# Patient Record
(demographics unavailable — no encounter records)

---

## 2024-12-07 NOTE — HISTORY OF PRESENT ILLNESS
[FreeTextEntry2] :  Violet is a 13y0m old transfemale adolescent on supprelin since 11/30/23 presenting for followup. She was last seen on 7/2/24. Labs obtained at the visit significant for LH 0.5 iu/l, testosterone 17.1 ng/dl (pre-pubertal), vitamin D 22.5 ng/ml.  Since her last visit, Violet moved with her family to [place] for mom to attend [employer] school. She has been well with no recent illness or hospitalization. No pubertal changes noted, moods have been stable. Violet had the following labs done on 11/23/24: LH <0.22 miu/ml, testosterone 0.16 ng/ml, vitamin D 38 ng/ml.   Violet is no longer receiving mental health services through [facility]. There is a counselor at school she can see as needed. She also has an appointment with a psychiatrist in January. She was previously taking stimulant medication for ADHD but ran out. She did not rush to refill as she felt anxious on it. She has been off of medication for ADHD since running out. She may or may not re-start when she sees psychiatry in January.   Violet is in the 8th grade, attends school in [place]. Classmates know her as Violet she/her, they do not know she is trans.

## 2024-12-07 NOTE — REASON FOR VISIT
[Home] : at home, [unfilled] , at the time of the visit. [Medical Office: ([facility])___] : at the medical office located in  [Follow-Up: _____] : a [unfilled] follow-up visit  [Patient] : patient [Father] : father [Medical Records] : medical records [FreeTextEntry2] : father [FreeTextEntry3] : father

## 2024-12-07 NOTE — CONSULT LETTER
[Dear  ___] : Dear  [unfilled], [Consult Letter:] : I had the pleasure of evaluating your patient, [unfilled]. [Please see my note below.] : Please see my note below. [Consult Closing:] : Thank you very much for allowing me to participate in the care of this patient.  If you have any questions, please do not hesitate to contact me. [Sincerely,] : Sincerely, [FreeTextEntry3] : Cee Leon MD [facility] Physician [facility] Division of Pediatric Endocrinology

## 2025-06-09 NOTE — PHYSICAL EXAM
[Well Nourished] : well nourished [Alert] : alert [Healthy Appearance] : healthy appearance [No Acute Distress] : no acute distress [Well Developed] : well developed [Normal Pupil & Iris Size/Symmetry] : normal pupil and iris size and symmetry [No Discharge] : no discharge [No Photophobia] : no photophobia [Sclera Not Icteric] : sclera not icteric [Normal Nasal Mucosa] : the nasal mucosa was normal [Normal Lips/Tongue] : the lips and tongue were normal [Normal TMs] : both tympanic membranes were normal [Normal Outer Ear/Nose] : the ears and nose were normal in appearance [Normal Tonsils] : normal tonsils [No Thrush] : no thrush [Pale mucosa] : pale mucosa [Supple] : the neck was supple [Normal Rate and Effort] : normal respiratory rhythm and effort [No Crackles] : no crackles [Bilateral Audible Breath Sounds] : bilateral audible breath sounds [No Retractions] : no retractions [Normal S1, S2] : normal S1 and S2 [Normal Rate] : heart rate was normal  [No murmur] : no murmur [Regular Rhythm] : with a regular rhythm [Soft] : abdomen soft [Not Tender] : non-tender [No HSM] : no hepato-splenomegaly [Not Distended] : not distended [Normal Cervical Lymph Nodes] : cervical [Skin Intact] : skin intact  [No Rash] : no rash [No Skin Lesions] : no skin lesions [No clubbing] : no clubbing [No Edema] : no edema [No Cyanosis] : no cyanosis [Normal Mood] : mood was normal [Normal Affect] : affect was normal [Alert, Awake, Oriented as Age-Appropriate] : alert, awake, oriented as age appropriate

## 2025-06-09 NOTE — HISTORY OF PRESENT ILLNESS
[(# ___since the last visit)] : [unfilled] visits to the emergency room since the last visit [(# ___ since the last visit)] : hospitalized [unfilled] times since the last visit [( # ___ since the last visit)] : intubated [unfilled] times since the last visit [0 x/month] : 0 x/month [None] : None [< or = 2 days/wk] : < than or = 2 days/week [> or = 20] : > than or = 20 [0 - 1/year] : 0 - 1/year [No] : No [FreeTextEntry1] : COCO MANDUJANO is a 13 year old, transfemale seen on 7/22/2024 for transgender care program followup. Patient is having a procedure on June 16th to remove the Supprelin implant in her left upper inner arm.   Preferred Pronouns:   she/her  Sexual orientation:  Gender identity: female/girl  Assigned male at birth Specific Terms for Body Parts:  private area Call pt: Coco  Moved to [place] 1 year ago for mom to start Vet school there.  She's in a bilingual school and doing well.   Marital Status: Father, Valeriano Mandujano, mother Aicha Lin. Parents are  and live together, both parents are supportive. Here today with Dad. Feels well supported at home.   Pt had Suprellin placed by Dr. Bravo on 11/2023.  Last saw Dr. Graf in Dec 2024.   Transition HX + Present Life:  Coco came out to her parents around age 9 or 10 years old, family has been supportive and wants her to be happy. Lives at home with mom, dad, and 2 siblings, cats, dogs, and assorted lizards, feels safe at home, food secure.  About age 9 , started thinking that she didn't' feel comfortable as a boy.  She did not know anyone else like this.  For fun, likes to play Roblox, and chatting online with friends (that she knows).    She met a friend in 1st grade, but she lost a friend in 4th grade as she came out.   At school started using Coco in the school year in in May because friends said the name 'suited her'.  She picked Coco, and mom likes Elis, so she goes by Coco Gillis.  Friends at school at very supportive of Coco, but some other kids in her class use racial and sexual slurs that bother her. Feels safe at school.   Name change: Insurance is now updated to reflect Coco.   Education | Employment:  Coco is going into 9th grade in person called [employer] in [place]. Patient doing well in school. Favorite subjects are math and english. Has a few close friends that she enjoys hanging out with. Does not play any sports, but likes to walk and swim.   Mental Health: Coco feels school is very stressful and she gets tired. She feels left out occasionally. Coco is sleeping better (10 pm to 8/9am). Anxiety is "pretty bad". Feels she is unable to speak sometimes because she is too anxious to talk. Feels like she has social anxiety. No longer seeing a therapist. Still does art. Has infrequent suicical ideations, but no plan and feels she would never do it. Can talk to both parents about these thoughts. No cutting or other self harm mechanisms.   [facility], dx with ADHD. Tx with Rx briefly, heart racing, went to Cardiology, and now tried interventions first before Rx.  Wait and see approach.  [facility] - reval in a few months if in [place].    Saw [facility].   Endocrinology,Dr. Graf.  Doing okay with Luz Elena, happy no puberty at this time.  Interested in starting estrogen to look like they want to be since they were little.   Interested in developing a chest with other girls her age. She' noticed that others are starting to do this and she does not want to use breast implants. .    Coping:  Likes to watch YouTube and listen to music.  Feelings of Gender Dysphoria/ Body Dysmorphia:  Feelings of gender dysphoria in the shower, but otherwise fine. She doesnt like the way she looks. Uses makeup.   Gender Presentation:  Presents feminine. Long hair, girls clothing.   Tattoos/ Piercing:  Ear piercing.   Cigarette, Vaping, Marijuana, Alcohol, and Drug Use:  N/A  Preferably interested in women.   Asthma symptoms well controlled, has not used albuterol in a few months.   Reproductive Endocrinology:  Pt expressed an interest in adopting a child, but not having a cihld with her sperm.  Discussed pausing blockers before starting hormone affimring therapy and pt and father agreed that this is not desired.  .  Gender Affirming Surgery:  Not sure yet.  Diet: likes to eat proteins and then carbs.    No boyfriend/girlfriend. No kissing Likes kpop music No EtOH/No Tob/No Vape/No substances  [FreeTextEntry7] : 22

## 2025-06-09 NOTE — HISTORY OF PRESENT ILLNESS
[(# ___since the last visit)] : [unfilled] visits to the emergency room since the last visit [(# ___ since the last visit)] : hospitalized [unfilled] times since the last visit [( # ___ since the last visit)] : intubated [unfilled] times since the last visit [0 x/month] : 0 x/month [None] : None [< or = 2 days/wk] : < than or = 2 days/week [0 - 1/year] : 0 - 1/year [> or = 20] : > than or = 20 [No] : No [FreeTextEntry1] : COCO MANDUJANO is a 13 year old, transfemale seen on 7/22/2024 for transgender care program followup. Patient is having a procedure on June 16th to remove the Supprelin implant in her left upper inner arm.   Preferred Pronouns:   she/her  Sexual orientation:  Gender identity: female/girl  Assigned male at birth Specific Terms for Body Parts:  private area Call pt: Coco  Moved to [place] 1 year ago for mom to start Vet school there.  She's in a bilingual school and doing well.   Marital Status: Father, Valeriano Mandujano, mother Aicha Lin. Parents are  and live together, both parents are supportive. Here today with Dad. Feels well supported at home.   Pt had Suprellin placed by Dr. Bravo on 11/2023.  Last saw Dr. Graf in Dec 2024.   Transition HX + Present Life:  Coco came out to her parents around age 9 or 10 years old, family has been supportive and wants her to be happy. Lives at home with mom, dad, and 2 siblings, cats, dogs, and assorted lizards, feels safe at home, food secure.  About age 9 , started thinking that she didn't' feel comfortable as a boy.  She did not know anyone else like this.  For fun, likes to play Roblox, and chatting online with friends (that she knows).    She met a friend in 1st grade, but she lost a friend in 4th grade as she came out.   At school started using Coco in the school year in in May because friends said the name 'suited her'.  She picked Coco, and mom likes Elis, so she goes by Coco Gillis.  Friends at school at very supportive of Coco, but some other kids in her class use racial and sexual slurs that bother her. Feels safe at school.   Name change: Insurance is now updated to reflect Coco.   Education | Employment:  Coco is going into 9th grade in person called "[employer] in [place]. Patient doing well in school. Favorite subjects are math and english. Has a few close friends that she enjoys hanging out with. Does not play any sports, but likes to walk and swim.   Mental Health: Coco feels school is very stressful and she gets tired. She feels left out occasionally. Coco is sleeping better (10 pm to 8/9am). Anxiety is "pretty bad". Feels she is unable to speak sometimes because she is too anxious to talk. Feels like she has social anxiety. No longer seeing a therapist. Still does art. Has infrequent suicical ideations, but no plan and feels she would never do it. Can talk to both parents about these thoughts. No cutting or other self harm mechanisms.   [facility], dx with ADHD. Tx with Rx briefly, heart racing, went to Cardiology, and now tried interventions first before Rx.  Wait and see approach.  [facility] - reval in a few months if in [place].    Saw [facility].   Endocrinology,Dr. Graf.  Doing okay with Luz Elena, happy no puberty at this time.  Interested in starting estrogen to look like they want to be since they were little.   Interested in developing a chest with other girls her age. She' noticed that others are starting to do this and she does not want to use breast implants. .    Coping:  Likes to watch YouTube and listen to music.  Feelings of Gender Dysphoria/ Body Dysmorphia:  Feelings of gender dysphoria in the shower, but otherwise fine. She doesnt like the way she looks. Uses makeup.   Gender Presentation:  Presents feminine. Long hair, girls clothing.   Tattoos/ Piercing:  Ear piercing.   Cigarette, Vaping, Marijuana, Alcohol, and Drug Use:  N/A  Preferably interested in women.   Asthma symptoms well controlled, has not used albuterol in a few months.   Reproductive Endocrinology:  Pt expressed an interest in adopting a child, but not having a cihld with her sperm.  Discussed pausing blockers before starting hormone affimring therapy and pt and father agreed that this is not desired.  .  Gender Affirming Surgery:  Not sure yet.  Diet: likes to eat proteins and then carbs.    No boyfriend/girlfriend. No kissing Likes kpop music No EtOH/No Tob/No Vape/No substances  [FreeTextEntry7] : 22

## 2025-06-11 NOTE — PHYSICAL EXAM
[Well groomed] : well groomed [Cooperative] : cooperative [Average] : average [Euthymic] : euthymic [Full] : full [Linear/Goal Directed] : linear/goal directed [Clear] : clear [None] : none [None Reported] : none reported [Above average] : above average [WNL] : within normal limits [Positive interaction] : positive interaction [Unremarkable/age appropriate] : unremarkable/age appropriate

## 2025-06-11 NOTE — PHYSICAL EXAM
[Well groomed] : well groomed [Average] : average [Cooperative] : cooperative [Euthymic] : euthymic [Full] : full [Linear/Goal Directed] : linear/goal directed [Clear] : clear [None] : none [None Reported] : none reported [Above average] : above average [Positive interaction] : positive interaction [WNL] : within normal limits [Unremarkable/age appropriate] : unremarkable/age appropriate

## 2025-06-12 NOTE — DISCUSSION/SUMMARY
[FreeTextEntry8] : BCW, Coco and her father, Valeriano, met session in person to discuss Coco's next steps for her gender affirming medical care. Coco presented as well-groomed, casually dressed and engaged in our visit and presents with a positive interaction with her father observed by this LCSW.  Coco's father is prepared to consent for both him and Coco's mother regarding Coco's next step in care with hormone replacement therapy since Coco has been on puberty blockers since Nov, 2023.  Coco and her dad traveled to [place] for her follow-up care since the family moved to [place] so that Coco's mother could attend veterinary school there; Coco lives there with her parents and two younger siblings.   During this visit, BCW administered the PHQ modified for teens in which Coco scored 8/27, and denying that she has experienced recent thoughts that she would be better off dead.  She reports some days feeling sad and feeling tired with low energy.  She reports that she can continue to engage in her hobbies and is more physically active in [place] because of the warmer weather.  Coco reports that her gender dysphoria is mostly managed to an extent, though sometimes she feels it is hard to connect with female peers because her experience with her gender is different from them.  She denies any major dysphoria with her genitals but reports dysphoria about her voice and hoping it does not get deeper, and notes that sometimes her body shape is uncomfortable to her.   With Coco's father present, we discuss what the family can expect regarding the changes that will occur, including what will be permanent and the time frame for expected changes.  BCW educates Coco and her father that she will be required to take two medications, a medication to block the testosterone in her body and estrogen to feminize her body.  We discuss the modalities that Coco can take these medications and the family agrees likely that pill form will be easiest to travel with and manage, but note that they will discuss this with the endocrinologist.  We review permanence of breast and nipple growth, that Coco will experience softening of her skin, redistribution of body fat and changes to muscle mass that will occur. We discuss how her genitals will be impacted by estrogen/blocker and the potential for future atrophy as Coco gets older and has been on HRT for some time, and how gender affirming surgeries can address pain/discomfort.  We review how in being on puberty blocker medication, Coco should not experience much more deepening of her voice,  and that her body hair will not be as course or thick but will grow in places like her under arms, legs and genital area.  We again review how fertility could be impacted long-term by starting HRT now and the family confirms that Coco's mental health will be prioritized over fertility preservation; we review how the LGBTQ+ community forms families in many different ways which Coco expresses understanding. We also discuss risks to overall long-term kidney functioning due to androgen blocking medication but again review how her ongoing follow-up and blood work with her providers will assist in managing these risks.  We discuss how Coco's emotions will be experienced, processed and expressed differently with estrogen, noting the potential for more anxiety, which she and her father can verbalize understanding.   Finally, Coco is educated on the importance of being a diligent patient.  We review the importance of her consistent medication compliance with her HRT so that she can achieve the necessary changes to affirm herself.  We review the importance of complying with her providers' recommendations and seeking care with specialized gender affirming providers in the future.  We review the importance of Coco managing her general health through healthy diet and exercise, avoiding use of drugs/alcohol/tobacco.  We review that taking estrogen will increase Coco's need for cancer monitoring, that she will be more at risk for blood clots.   Coco and her father are provided with written education from [facility], a guide on feminizing hormones so that they can review the information with Coco's mom, who Mr. Gus moser will likely be joining the visit with the endocrinologist via FaceTime call from [place].  The family is encouraged to consider any additional questions prior to their visit with Dr. Leon on Fri, 6/13/25.    Time in: 12:01pm     Time out:  12:57pm Total time spent face to face: 56 min [FreeTextEntry4] : At this time, there are no contraindications for Coco to begin gender affirming hormone replacement therapy since starting puberty blockers in Nov, 2023.  Coco's gender identity and presentation are consistent over the last three years of her treatment since initially engaging in care in August, 2022 and returning in July, 2023 with the start of puberty.  A letter of support will be documented so that Coco can discuss the risks and benefits of treatment with hormone replacement therapy with her endocrinologist, Dr. Leon. It is recommended that the family consider outpatient mental health treatment options in [place]; they are aware they can reach our office in a crisis, if needed.

## 2025-06-12 NOTE — PSYCHOSOCIAL ASSESSMENT
[None known] : None known [Yes (add details)] : Patient attended school, home tutoring, or received education instruction at anytime in the past three months? Yes [Grade: ____] : [unfilled] grade [No] : No [Student] : student [Dependent on guardian] : dependent on guardian [FreeTextEntry2] : Coco's family is exceptionally supportive and have allowed Coco to be her authentic self from a young age.  Coco notes that the family remains close with extended family, especially now in visiting [place].  [FreeTextEntry1] : Coco is ,  and Chinese, notes leaning more into the  side of her culture.

## 2025-06-12 NOTE — REASON FOR VISIT
[Other: ___] : [unfilled] [Patient with collateral] : Patient with collateral  [Father] : father [TextBox_17] : Valeriano hansen  [FreeTextEntry1] : Coco is a 12 y/o  trans female (AMAB; she/her pronouns) who was seen for visit with her father, Valeriano, lauri.  Coco and this LCSW initially met in August, 2023 to discuss her options for the start of her medical transition with puberty blockers, during which she was cleared to start and had her implant placed on 11/30/2023.  Since then, Coco and her family moved out of the  to [place] where her mother is attending veterinary school, thus Coco and her father traveled back to [place] for Coco's medical care.    Coco is domiciled with her family in a private home in [place].  Her parents are , and she is the oldest of three, with two younger siblings.  Coco completed her school year in [place] in May, attends  in [place].  Coco describes herself as a "good student" noting that her grades overall are high and that her favorite subject is math.  Coco's father denies that there have been any academic concerns.  Socially, Coco admits that it was challenging to make new friends but that she has made one very close friends and a few other friends since moving.  Coco admits sometimes feeling that it can be hard to connect with female peers as she does not yet have the same experiences as all females, and hopes that this will change when she starts hormone replacement therapy.  In her free time, Coco shares that she enjoys reading, listening to her favorite music artists, going to museums and snorkeling, which is something she started doing since moving to [place].

## 2025-06-12 NOTE — HISTORY OF PRESENT ILLNESS
[FreeTextEntry1] : Coco and her family initially sought gender affirming care in June, 2022 when she became a new patient.  At the time of her initial visit, Coco had not yet started puberty, so the family returned a year later to begin the process of initiating her medical transition.  Coco began expressing her gender identity being different from her gender assigned at birth about a year prior to her initial visit in 2022.  Coco identified a trans femme identity, initially using she/they pronouns and noting her preference for more stereotypical female gender expression through her clothing and hairstyle.  Coco shared that she wanted to experiment wearing dresses and sharing clothes with her mom, and also started growing her hair longer at this time.  Coco and her family also legally changed her name prior to moving.  During this visit, Coco states "I feel like I've always felt" regarding her gender identity and presents with gender expression congruent with this identity.  Coco expresses that she wants to proceed with starting hormone replacement therapy so that her body does not experience any masculinizing effects.   In efforts to be her authentic self at school, she came out to some peers, which resulted in both positive and negative experiences for her, with some instances of bullying, resulting in Coco expressing passive suicidal ideation; treated in urgent care, but not admitted for inpatient care.  The family chose to homeschool Coco prior to moving to [place], where Coco now attends school with her peers.  Coco has a history of outpatient talk therapy to address her coming out experience, some anxiety and difficulties sleeping.  She is not currently engaged in mental health care as it has been challenging for the family to connect to care in [place] because of their stance on transgender care for minors, but we review how it may be appropriate for the family to seek care related to symptoms of depression or anxiety as Coco's gender dysphoria is managed with medications.  LCSW administered the PHQ-modified for teens and Coco scored 8/27 and indicated that she has not had thoughts that she would be better off dead, but indicating mild depressive symptoms. At this time, these symptoms do not interfere with Coco's ability to express her gender identity and verbalize her wish to experience feminizing effects of HRT.

## 2025-06-12 NOTE — HISTORY OF PRESENT ILLNESS
[FreeTextEntry1] : oCco and her family initially sought gender affirming care in June, 2022 when she became a new patient.  At the time of her initial visit, Coco had not yet started puberty, so the family returned a year later to begin the process of initiating her medical transition.  Coco began expressing her gender identity being different from her gender assigned at birth about a year prior to her initial visit in 2022.  Coco identified a trans femme identity, initially using she/they pronouns and noting her preference for more stereotypical female gender expression through her clothing and hairstyle.  Coco shared that she wanted to experiment wearing dresses and sharing clothes with her mom, and also started growing her hair longer at this time.  Coco and her family also legally changed her name prior to moving.  During this visit, Coco states "I feel like I've always felt" regarding her gender identity and presents with gender expression congruent with this identity.  Coco expresses that she wants to proceed with starting hormone replacement therapy so that her body does not experience any masculinizing effects.   In efforts to be her authentic self at school, she came out to some peers, which resulted in both positive and negative experiences for her, with some instances of bullying, resulting in Coco expressing passive suicidal ideation; treated in urgent care, but not admitted for inpatient care.  The family chose to homeschool Coco prior to moving to [place], where Coco now attends school with her peers.  Coco has a history of outpatient talk therapy to address her coming out experience, some anxiety and difficulties sleeping.  She is not currently engaged in mental health care as it has been challenging for the family to connect to care in [place] because of their stance on transgender care for minors, but we review how it may be appropriate for the family to seek care related to symptoms of depression or anxiety as Coco's gender dysphoria is managed with medications.  LCSW administered the PHQ-modified for teens and Coco scored 8/27 and indicated that she has not had thoughts that she would be better off dead, but indicating mild depressive symptoms. At this time, these symptoms do not interfere with Coco's ability to express her gender identity and verbalize her wish to experience feminizing effects of HRT.

## 2025-06-12 NOTE — REASON FOR VISIT
[Other: ___] : [unfilled] [Patient with collateral] : Patient with collateral  [Father] : father [TextBox_17] : Valeriano hansen  [FreeTextEntry1] : Coco is a 12 y/o  trans female (AMAB; she/her pronouns) who was seen for visit with her father, Valeriano, lauri.  Coco and this LCSW initially met in August, 2023 to discuss her options for the start of her medical transition with puberty blockers, during which she was cleared to start and had her implant placed on 11/30/2023.  Since then, Coco and her family moved out of the  to [place] where her mother is attending veterinary school, thus Coco and her father traveled back to [place] for Coco's medical care.    Coco is domiciled with her family in a private home in [place].  Her parents are , and she is the oldest of three, with two younger siblings.  Coco completed her school year in [place] in May, attends  in [place].  Coco describes herself as a "good student" noting that her grades overall are high and that her favorite subject is math.  Coco's father denies that there have been any academic concerns.  Socially, Coco admits that it was challenging to make new friends but that she has made one very close friends and a few other friends since moving.  Coco admits sometimes feeling that it can be hard to connect with female peers as she does not yet have the same experiences as all females, and hopes that this will change when she starts hormone replacement therapy.  In her free time, Coco shares that she enjoys reading, listening to her favorite music artists, going to museums and snorkeling, which is something she started doing since moving to [place].

## 2025-06-12 NOTE — DISCUSSION/SUMMARY
[FreeTextEntry8] : BCW, Coco and her father, Valeriano, met session in person to discuss Coco's next steps for her gender affirming medical care. Coco presented as well-groomed, casually dressed and engaged in our visit and presents with a positive interaction with her father observed by this LCSW.  Coco's father is prepared to consent for both him and Coco's mother regarding Coco's next step in care with hormone replacement therapy since Coco has been on puberty blockers since Nov, 2023.  Coco and her dad traveled to [place] for her follow-up care since the family moved to [place] so that Coco's mother could attend veterinary school there; Coco lives there with her parents and two younger siblings.   During this visit, BCW administered the PHQ modified for teens in which Coco scored 8/27, and denying that she has experienced recent thoughts that she would be better off dead.  She reports some days feeling sad and feeling tired with low energy.  She reports that she can continue to engage in her hobbies and is more physically active in [place] because of the warmer weather.  Coco reports that her gender dysphoria is mostly managed to an extent, though sometimes she feels it is hard to connect with female peers because her experience with her gender is different from them.  She denies any major dysphoria with her genitals but reports dysphoria about her voice and hoping it does not get deeper, and notes that sometimes her body shape is uncomfortable to her.   With Coco's father present, we discuss what the family can expect regarding the changes that will occur, including what will be permanent and the time frame for expected changes.  BCW educates Coco and her father that she will be required to take two medications, a medication to block the testosterone in her body and estrogen to feminize her body.  We discuss the modalities that Coco can take these medications and the family agrees likely that pill form will be easiest to travel with and manage, but note that they will discuss this with the endocrinologist.  We review permanence of breast and nipple growth, that Coco will experience softening of her skin, redistribution of body fat and changes to muscle mass that will occur. We discuss how her genitals will be impacted by estrogen/blocker and the potential for future atrophy as Coco gets older and has been on HRT for some time, and how gender affirming surgeries can address pain/discomfort.  We review how in being on puberty blocker medication, Coco should not experience much more deepening of her voice,  and that her body hair will not be as course or thick but will grow in places like her under arms, legs and genital area.  We again review how fertility could be impacted long-term by starting HRT now and the family confirms that Coco's mental health will be prioritized over fertility preservation; we review how the LGBTQ+ community forms families in many different ways which Coco expresses understanding. We also discuss risks to overall long-term kidney functioning due to androgen blocking medication but again review how her ongoing follow-up and blood work with her providers will assist in managing these risks.  We discuss how Coco's emotions will be experienced, processed and expressed differently with estrogen, noting the potential for more anxiety, which she and her father can verbalize understanding.   Finally, Coco is educated on the importance of being a diligent patient.  We review the importance of her consistent medication compliance with her HRT so that she can achieve the necessary changes to affirm herself.  We review the importance of complying with her providers' recommendations and seeking care with specialized gender affirming providers in the future.  We review the importance of Coco managing her general health through healthy diet and exercise, avoiding use of drugs/alcohol/tobacco.  We review that taking estrogen will increase Coco's need for cancer monitoring, that she will be more at risk for blood clots.   Coco and her father are provided with written education from [facility], a guide on feminizing hormones so that they can review the information with Coco's mom, who Mr. Gus moser will likely be joining the visit with the endocrinologist via FaceTime call from [place].  The family is encouraged to consider any additional questions prior to their visit with Dr. Leon on Fri, 6/13/25.    Time in: 12:01pm     Time out:  12:57pm Total time spent face to face: 56 min [FreeTextEntry4] : At this time, there are no contraindications for Coco to begin gender affirming hormone replacement therapy since starting puberty blockers in Nov, 2023.  Coco's gender identity and presentation are consistent over the last three years of her treatment since initially engaging in care in August, 2022 and returning in July, 2023 with the start of puberty.  A letter of support will be documented so that Coco can discuss the risks and benefits of treatment with hormone replacement therapy with her endocrinologist, Dr. Leon. It is recommended that the family consider outpatient mental health treatment options in [place]; they are aware they can reach our office in a crisis, if needed.

## 2025-06-14 NOTE — HISTORY OF PRESENT ILLNESS
[FreeTextEntry2] : Coco is a 13y6m old transfemale adolescent on supprelin since 11/30/23 presenting for followup. She was last seen on 12/6/24 via telehealth. Most recent labs were done on 11/23/24: LH <0.22 miu/ml, testosterone 0.16 ng/ml, vitamin D 38 ng/ml.  Since her last visit, COCO has been well with no recent illness or hospitalization. She is not taking any other medications besides for supprelin. She is not taking vitamin D. She id living in [place] while mom attends [employer] school there. Coco completed 8th grade and will be going to  next year. Family plans to stay in [place] once mom is done with her 4-year program. 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

## 2025-06-14 NOTE — CONSULT LETTER
[Dear  ___] : Dear  [unfilled], [Consult Letter:] : I had the pleasure of evaluating your patient, [unfilled]. [Please see my note below.] : Please see my note below. [Consult Closing:] : Thank you very much for allowing me to participate in the care of this patient.  If you have any questions, please do not hesitate to contact me. [Sincerely,] : Sincerely, [FreeTextEntry3] : Cee Leon MD [facility] Physician [facility] Division of Pediatric Endocrinology

## 2025-06-14 NOTE — CONSULT LETTER
[Dear  ___] : Dear  [unfilled], [Consult Letter:] : I had the pleasure of evaluating your patient, [unfilled]. [Please see my note below.] : Please see my note below. [Consult Closing:] : Thank you very much for allowing me to participate in the care of this patient.  If you have any questions, please do not hesitate to contact me. [Sincerely,] : Sincerely, [FreeTextEntry3] : Cee Leon MD [facility] Physician [facility] Division of Pediatric Endocrinology

## 2025-06-14 NOTE — HISTORY OF PRESENT ILLNESS
[FreeTextEntry2] : Coco is a 13y6m old transfemale adolescent on supprelin since 11/30/23 presenting for followup. She was last seen on 12/6/24 via telehealth. Most recent labs were done on 11/23/24: LH <0.22 miu/ml, testosterone 0.16 ng/ml, vitamin D 38 ng/ml.  Since her last visit, COCO has been well with no recent illness or hospitalization. She is not taking any other medications besides for supprelin. She is not taking vitamin D. She id living in [place] while mom attends [employer] school there. Coco completed 8th grade and will be going to  next year. Family plans to stay in [place] once mom is done with her 4-year program. 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

## 2025-06-14 NOTE — PHYSICAL EXAM
[Healthy Appearing] : healthy appearing [Well Nourished] : well nourished [Interactive] : interactive [Normal Appearance] : normal appearance [Well formed] : well formed [Normally Set] : normally set [Normal S1 and S2] : normal S1 and S2 [Murmur] : no murmurs [Clear to Ausculation Bilaterally] : clear to auscultation bilaterally [Abdomen Soft] : soft [Abdomen Tenderness] : non-tender [] : no hepatosplenomegaly [Normal] : normal  [de-identified] : shoulder length hair, glasses

## 2025-06-14 NOTE — PHYSICAL EXAM
[Healthy Appearing] : healthy appearing [Well Nourished] : well nourished [Interactive] : interactive [Normal Appearance] : normal appearance [Well formed] : well formed [Normally Set] : normally set [Normal S1 and S2] : normal S1 and S2 [Murmur] : no murmurs [Clear to Ausculation Bilaterally] : clear to auscultation bilaterally [Abdomen Soft] : soft [Abdomen Tenderness] : non-tender [] : no hepatosplenomegaly [Normal] : normal  [de-identified] : shoulder length hair, glasses